# Patient Record
Sex: FEMALE | Race: WHITE | NOT HISPANIC OR LATINO | Employment: UNEMPLOYED | URBAN - NONMETROPOLITAN AREA
[De-identification: names, ages, dates, MRNs, and addresses within clinical notes are randomized per-mention and may not be internally consistent; named-entity substitution may affect disease eponyms.]

---

## 2023-05-30 ENCOUNTER — TELEMEDICINE (OUTPATIENT)
Dept: PRIMARY CARE | Facility: CLINIC | Age: 58
End: 2023-05-30
Payer: COMMERCIAL

## 2023-05-30 DIAGNOSIS — L08.9 SKIN INFECTION: ICD-10-CM

## 2023-05-30 DIAGNOSIS — L97.321 SKIN ULCER OF LEFT ANKLE, LIMITED TO BREAKDOWN OF SKIN (MULTI): Primary | ICD-10-CM

## 2023-05-30 PROBLEM — J04.0 LARYNGITIS: Status: ACTIVE | Noted: 2023-05-30

## 2023-05-30 PROBLEM — J01.00 ACUTE MAXILLARY SINUSITIS: Status: ACTIVE | Noted: 2023-05-30

## 2023-05-30 PROBLEM — U07.1 COVID-19 VIRUS INFECTION: Status: ACTIVE | Noted: 2023-05-30

## 2023-05-30 PROBLEM — J30.9 ALLERGIC RHINITIS: Status: ACTIVE | Noted: 2023-05-30

## 2023-05-30 PROBLEM — R60.9 EDEMA: Status: ACTIVE | Noted: 2023-05-30

## 2023-05-30 PROBLEM — E55.9 VITAMIN D DEFICIENCY: Status: ACTIVE | Noted: 2023-05-30

## 2023-05-30 PROBLEM — J20.9 ACUTE BRONCHITIS: Status: ACTIVE | Noted: 2023-05-30

## 2023-05-30 PROBLEM — R53.82 CHRONIC FATIGUE: Status: ACTIVE | Noted: 2023-05-30

## 2023-05-30 PROBLEM — N39.0 ACUTE LOWER UTI: Status: ACTIVE | Noted: 2023-05-30

## 2023-05-30 PROBLEM — K21.9 GERD WITHOUT ESOPHAGITIS: Status: ACTIVE | Noted: 2023-05-30

## 2023-05-30 PROBLEM — M25.551 ACUTE PAIN OF RIGHT HIP: Status: ACTIVE | Noted: 2023-05-30

## 2023-05-30 PROBLEM — S91.009A WOUND OF ANKLE: Status: ACTIVE | Noted: 2023-05-30

## 2023-05-30 PROBLEM — M54.31 SCIATICA OF RIGHT SIDE: Status: ACTIVE | Noted: 2023-05-30

## 2023-05-30 PROBLEM — F41.9 ANXIETY: Status: ACTIVE | Noted: 2023-05-30

## 2023-05-30 PROBLEM — E78.5 DYSLIPIDEMIA: Status: ACTIVE | Noted: 2023-05-30

## 2023-05-30 PROBLEM — M25.572 LEFT ANKLE PAIN: Status: ACTIVE | Noted: 2023-05-30

## 2023-05-30 PROCEDURE — 99213 OFFICE O/P EST LOW 20 MIN: CPT | Performed by: FAMILY MEDICINE

## 2023-05-30 RX ORDER — FLUTICASONE PROPIONATE 50 MCG
2 SPRAY, SUSPENSION (ML) NASAL 2 TIMES DAILY
COMMUNITY

## 2023-05-30 RX ORDER — LORATADINE 10 MG/1
1 TABLET ORAL NIGHTLY
COMMUNITY
Start: 2020-07-10

## 2023-05-30 RX ORDER — CEPHALEXIN 500 MG/1
500 CAPSULE ORAL 2 TIMES DAILY
Qty: 14 CAPSULE | Refills: 0 | Status: SHIPPED | OUTPATIENT
Start: 2023-05-30 | End: 2023-06-06

## 2023-05-30 RX ORDER — CELECOXIB 200 MG/1
200 CAPSULE ORAL
COMMUNITY

## 2023-05-30 RX ORDER — FUROSEMIDE 40 MG/1
1 TABLET ORAL DAILY
COMMUNITY
Start: 2021-07-25

## 2023-05-30 RX ORDER — CEPHALEXIN 500 MG/1
500 CAPSULE ORAL 2 TIMES DAILY
Qty: 14 CAPSULE | Refills: 0 | Status: SHIPPED | OUTPATIENT
Start: 2023-05-30 | End: 2023-05-30

## 2023-05-30 RX ORDER — POTASSIUM CHLORIDE 750 MG/1
1 TABLET, EXTENDED RELEASE ORAL DAILY
COMMUNITY
Start: 2021-07-25

## 2023-05-31 NOTE — PROGRESS NOTES
Subjective     Nilsa Lo is a 58 y.o. female who presents for Skin Problem (Skin infection lower leg).  Had attended video call appointment  This was completed via telephone due to the restrictions of the COVID-19 pandemic.  All issues as below were discussed and addressed but no physical exam was performed.  If it was felt that the patient should be evaluated in clinic then they were director there.  The patient/parent verbally consented to the visit.    HPI  Complaining wound and skin around the wound is red and tender.  Was seen in urgent care twice.  Started on medication.  Still complaining skin redness around the wound and tenderness.  Recommended adding Keflex.  Complaining the wound on the left ankle      Review of Systems  Dictated as above      Objective   Virtual visit    Physical Exam  Virtual visit  Assessment/Plan   1.  Skin ulcer of left ankle.  Educated on wound care.  Added Keflex.  Explained adverse post medication.  Advised to call 911 or to go to the emergency room as soon as possible if the redness is growing, pain and pain is getting worse, fever, chills, nausea and vomiting.  She understood verbalized understood and agreed.    2.  Skin infection.  Advised on warm compresses.  Started on Keflex.  Advised to call 911 or to go to the emergency room as soon as possible if the redness is growing, pain and pain is getting worse, fever and chills or nausea and vomiting.  She understood verbalized understood and agreed              Problem List Items Addressed This Visit    None  Visit Diagnoses       Skin ulcer of left ankle, limited to breakdown of skin (CMS/[facility])    -  Primary    Relevant Medications    cephalexin (Keflex) 500 mg capsule    Skin infection        Relevant Medications    cephalexin (Keflex) 500 mg capsule

## 2024-01-29 DIAGNOSIS — K21.9 GASTRO-ESOPHAGEAL REFLUX DISEASE WITHOUT ESOPHAGITIS: ICD-10-CM

## 2024-01-29 RX ORDER — ESOMEPRAZOLE MAGNESIUM 40 MG/1
40 CAPSULE, DELAYED RELEASE ORAL DAILY
Qty: 90 CAPSULE | Refills: 0 | Status: SHIPPED | OUTPATIENT
Start: 2024-01-29

## 2024-10-28 NOTE — H&P (VIEW-ONLY)
"Subjective   Patient ID: Nilsa Lo is a 59 y.o. female who presents for Colonoscopy Consult.  HPI  Patient returns to clinic for Colonoscopy Consult.  Patient's last Colonoscopy was on 12/6/2018 and recommended repeat colonoscopy in 10 years.  Per patient she has been having lower abdominal pain and her PCP referred her back to Dr. Back another Colonoscopy.  Patient has not seen a GI specialist and no recent imaging done.  Patient referred by Dr. Abraham.  Describing pain in the left lower quadrant.  She has had it intermittently however in August she had an episode where it was quite pulsatile and very painful.  It stopped her from standing up in a sitting position.  She has not had an episode like that since August but she does have some low-grade discomfort bilateral lower abdomen.  Patient had a CT scan done by the [facility] in September.  I only have the report at this time and it does not mention any evidence of diverticulosis and no evidence of hernia.  He is read as normal.  Patient's weight has been stable.  Patient does have a history of ovarian cysts.    Social History:  Tobacco Use - no   Do you use any recreational drugs -no   Alcohol Use -no   Caffeine Intake - coffee  Working - no  Marital status -   Living with - , 3 kids  Review of Systems    Objective   Physical Exam  BP (!) 148/92 (BP Location: Left arm, Patient Position: Sitting, BP Cuff Size: Adult)   Pulse 78   Temp 37.1 °C (98.7 °F) (Temporal)   Ht 1.575 m (5' 2\")   Wt 89.8 kg (198 lb)   LMP  (LMP Unknown)   SpO2 99%   BMI 36.21 kg/m²    GENERAL APPEARANCE: Patient appears in no acute distress.   EYES: Sclera non-icteric, PERRLA.   ENT Normal appearance of ears and nose.   NECK/THYROID: Neck: no masses. Thyroid: no masses.   LYMPH NODES: No cervical or supraclavicular lymphadenopathy.   CARDIOVASCULAR Heart: RRR, no murmurs; Carotid bruits: none; Peripheral edema: none.   RESPIRATORY: Lungs: clear to " auscultation bilaterally; no respiratory distress.   GI (ABDOMEN) No intraabdominal mass appreciated, no hepatosplenomegaly; Hernia: none, no obvious evidence of inguinal hernia left or right side.  Some minimal tenderness with direct space palpation on the left side.  Tenderness: none.   PSYCH: Patient oriented to time, place and person, normal affect.    Assessment/Plan   Problem List Items Addressed This Visit             ICD-10-CM    Left lower quadrant abdominal pain - Primary R10.32     Patient with lower abdominal pain.  Last colonoscopy 2018.  At this time recommend repeat colonoscopy.  Recommend repeat colonoscopy.  Risk-benefit alternatives of doing the procedure were thoroughly discussed with the patient agrees to proceed.  This includes a 0.02% risk of perforating the colon with polypectomies.  The RN will go over the bowel prep when scheduling.   Patient does not have any evidence of inguinal hernia on exam although her history and pain may be more consistent with this problem.  I need to get a copy of the CT scan done at the [facility] so that I may review the films myself.  Will go over those films and her colonoscopy results and a follow-up appointment         Relevant Orders    Colonoscopy Diagnostic            Myra Back MD 10/31/24 11:28 AM

## 2024-10-31 ENCOUNTER — OFFICE VISIT (OUTPATIENT)
Dept: SURGERY | Facility: CLINIC | Age: 59
End: 2024-10-31
Payer: COMMERCIAL

## 2024-10-31 VITALS
HEART RATE: 78 BPM | OXYGEN SATURATION: 99 % | WEIGHT: 198 LBS | TEMPERATURE: 98.7 F | HEIGHT: 62 IN | DIASTOLIC BLOOD PRESSURE: 92 MMHG | BODY MASS INDEX: 36.44 KG/M2 | SYSTOLIC BLOOD PRESSURE: 148 MMHG

## 2024-10-31 DIAGNOSIS — R10.32 LEFT LOWER QUADRANT ABDOMINAL PAIN: Primary | ICD-10-CM

## 2024-10-31 PROCEDURE — 99204 OFFICE O/P NEW MOD 45 MIN: CPT | Performed by: SURGERY

## 2024-10-31 PROCEDURE — 99214 OFFICE O/P EST MOD 30 MIN: CPT | Performed by: SURGERY

## 2024-10-31 PROCEDURE — 1036F TOBACCO NON-USER: CPT | Performed by: SURGERY

## 2024-10-31 PROCEDURE — 3008F BODY MASS INDEX DOCD: CPT | Performed by: SURGERY

## 2024-10-31 ASSESSMENT — PAIN SCALES - GENERAL
PAINLEVEL_OUTOF10: 2
PAINLEVEL_OUTOF10: 2

## 2024-10-31 ASSESSMENT — COLUMBIA-SUICIDE SEVERITY RATING SCALE - C-SSRS
2. HAVE YOU ACTUALLY HAD ANY THOUGHTS OF KILLING YOURSELF?: NO
6. HAVE YOU EVER DONE ANYTHING, STARTED TO DO ANYTHING, OR PREPARED TO DO ANYTHING TO END YOUR LIFE?: NO
1. IN THE PAST MONTH, HAVE YOU WISHED YOU WERE DEAD OR WISHED YOU COULD GO TO SLEEP AND NOT WAKE UP?: NO

## 2024-10-31 ASSESSMENT — PATIENT HEALTH QUESTIONNAIRE - PHQ9
2. FEELING DOWN, DEPRESSED OR HOPELESS: NOT AT ALL
1. LITTLE INTEREST OR PLEASURE IN DOING THINGS: NOT AT ALL
SUM OF ALL RESPONSES TO PHQ9 QUESTIONS 1 & 2: 0

## 2024-10-31 ASSESSMENT — ENCOUNTER SYMPTOMS
LOSS OF SENSATION IN FEET: 0
DEPRESSION: 0
OCCASIONAL FEELINGS OF UNSTEADINESS: 0

## 2024-11-14 ENCOUNTER — APPOINTMENT (OUTPATIENT)
Dept: PREADMISSION TESTING | Facility: HOSPITAL | Age: 59
End: 2024-11-14
Payer: COMMERCIAL

## 2024-11-18 ENCOUNTER — APPOINTMENT (OUTPATIENT)
Dept: PREADMISSION TESTING | Facility: HOSPITAL | Age: 59
End: 2024-11-18
Payer: COMMERCIAL

## 2024-11-21 ENCOUNTER — PRE-ADMISSION TESTING (OUTPATIENT)
Dept: PREADMISSION TESTING | Facility: HOSPITAL | Age: 59
End: 2024-11-21
Payer: COMMERCIAL

## 2024-11-21 VITALS
HEART RATE: 82 BPM | OXYGEN SATURATION: 99 % | SYSTOLIC BLOOD PRESSURE: 159 MMHG | HEIGHT: 63 IN | DIASTOLIC BLOOD PRESSURE: 80 MMHG | BODY MASS INDEX: 33.66 KG/M2 | TEMPERATURE: 96.6 F | WEIGHT: 190 LBS | RESPIRATION RATE: 16 BRPM

## 2024-11-21 DIAGNOSIS — Z01.818 PRE-OP TESTING: Primary | ICD-10-CM

## 2024-11-21 PROCEDURE — 99203 OFFICE O/P NEW LOW 30 MIN: CPT | Performed by: PHYSICIAN ASSISTANT

## 2024-11-21 RX ORDER — MULTIVITAMIN/IRON/FOLIC ACID 18MG-0.4MG
1 TABLET ORAL DAILY
COMMUNITY

## 2024-11-21 ASSESSMENT — DUKE ACTIVITY SCORE INDEX (DASI)
CAN YOU HAVE SEXUAL RELATIONS: YES
CAN YOU DO LIGHT WORK AROUND THE HOUSE LIKE DUSTING OR WASHING DISHES: YES
CAN YOU WALK A BLOCK OR TWO ON LEVEL GROUND: YES
CAN YOU PARTICIPATE IN MODERATE RECREATIONAL ACTIVITIES LIKE GOLF, BOWLING, DANCING, DOUBLES TENNIS OR THROWING A BASEBALL OR FOOTBALL: YES
CAN YOU DO YARD WORK LIKE RAKING LEAVES, WEEDING OR PUSHING A MOWER: YES
CAN YOU RUN A SHORT DISTANCE: NO
CAN YOU TAKE CARE OF YOURSELF (EAT, DRESS, BATHE, OR USE TOILET): YES
CAN YOU DO HEAVY WORK AROUND THE HOUSE LIKE SCRUBBING FLOORS OR LIFTING AND MOVING HEAVY FURNITURE: YES
DASI METS SCORE: 8
CAN YOU PARTICIPATE IN STRENOUS SPORTS LIKE SWIMMING, SINGLES TENNIS, FOOTBALL, BASKETBALL, OR SKIING: NO
CAN YOU WALK INDOORS, SUCH AS AROUND YOUR HOUSE: YES
CAN YOU DO MODERATE WORK AROUND THE HOUSE LIKE VACUUMING, SWEEPING FLOORS OR CARRYING GROCERIES: YES
CAN YOU CLIMB A FLIGHT OF STAIRS OR WALK UP A HILL: YES
TOTAL_SCORE: 42.7

## 2024-11-21 ASSESSMENT — ENCOUNTER SYMPTOMS: ROS GI COMMENTS: SEE HPI

## 2024-11-21 NOTE — PREPROCEDURE INSTRUCTIONS
PAT DISCHARGE INSTRUCTIONS    Please call the Same Day Surgery (SDS) Department of the hospital where your procedure will be performed after 2:00 PM the day before your surgery. If you are scheduled on a Monday, or a Tuesday following a Monday holiday, you will need to call on the last business day prior to your surgery.    [facility]  [address] [place] [address] [phone]  [facility]  [address] [facility], [id number] [phone]  [facility]  [address] [facility].  [place], [place] [id number] [phone]    Please let your surgeon know if:      You develop any open sores, shingles, burning or painful urination as these may increase your risk of an infection.   You no longer wish to have the surgery.   Any other personal circumstances change that may lead to the need to cancel or defer this surgery-such as being sick or getting admitted to any hospital within one week of your planned procedure.    Your contact details change, such as a change of address or phone number.    Starting now:     Please DO NOT drink alcohol or smoke for 24 hours before surgery. It is well known that quitting smoking can make a huge difference to your health and recovery from surgery. The longer you abstain from smoking, the better your chances of a healthy recovery. If you need help with quitting, call 1-800-QUIT-NOW to be connected to a trained counselor who will discuss the best methods to help you quit.     Before your surgery:    Please stop all supplements 7 days prior to surgery. Or as directed by your surgeon.   Please stop taking NSAID pain medicine such as Advil and Motrin 7 days before surgery.    If you develop any fever, cough, cold, rashes, cuts, scratches, scrapes, urinary symptoms or infection anywhere on your body (including teeth and gums) prior to surgery, please call  your surgeon’s office as soon as possible. This may require treatment to reduce the chance of cancellation on the day of surgery.    The day before your surgery:   DIET- PLEASE FOLLOW DR VASQUES BOWEL PREP INSTRUCTIONS   Get a good night’s rest.  Use the special soap for bathing if you have been instructed to use one.    Scheduled surgery times may change and you will be notified if this occurs - please check your personal voicemail for any updates.     On the morning of surgery:   Wear comfortable, loose fitting clothes which open in the front. Please do not wear moisturizers, creams, lotions, makeup or perfume.    Please bring with you to surgery:   Photo ID and insurance card   Current list of medicines and allergies   Pacemaker/ Defibrillator/Heart stent cards   CPAP machine and mask    Slings/ splints/ crutches   A copy of your complete advanced directive/DHPOA.    Please do NOT bring with you to surgery:   All jewelry and valuables should be left at home.   Prosthetic devices such as contact lenses, hearing aids, dentures, eyelash extensions, hairpins and body piercings must be removed prior to going in to the surgical suite.    After outpatient surgery:   A responsible adult MUST accompany you at the time of discharge and stay with you for 24 hours after your surgery. You may NOT drive yourself home after surgery.    Do not drive, operate machinery, make critical decisions or do activities that require co-ordination or balance until after a night’s sleep.   Do not drink alcoholic beverages for 24 hours.   Instructions for resuming your medications will be provided by your surgeon.    CALL YOUR DOCTOR AFTER SURGERY IF YOU HAVE:     Chills and/or a fever of 101° F or higher.    Redness, swelling, pus or drainage from your surgical wound or a bad smell from the wound.    Lightheadedness, fainting or confusion.    Persistent vomiting (throwing up) and are not able to eat or drink for 12 hours.    Three or more  loose, watery bowel movements in 24 hours (diarrhea).   Difficulty or pain while urinating( after non-urological surgery)    Pain and swelling in your legs, especially if it is only on one side.    Difficulty breathing or are breathing faster than normal.    Any new concerning symptoms.         Medication List            Accurate as of November 21, 2024 10:13 AM. Always use your most recent med list.                b complex 0.4 mg tablet  Additional Medication Adjustments for Surgery: Take last dose 7 days before surgery     celecoxib 200 mg capsule  Commonly known as: CeleBREX  Additional Medication Adjustments for Surgery: Take last dose 7 days before surgery     esomeprazole 40 mg DR capsule  Commonly known as: NexIUM  Take 1 capsule (40 mg) by mouth once daily. Do not open capsule.  Notes to patient: Take evening dose before surgery.     fluticasone 50 mcg/actuation nasal spray  Commonly known as: Flonase  Medication Adjustments for Surgery: Take/Use as prescribed     furosemide 40 mg tablet  Commonly known as: Lasix  Medication Adjustments for Surgery: Do Not take on the morning of surgery     loratadine 10 mg tablet  Commonly known as: Claritin  Medication Adjustments for Surgery: Take/Use as prescribed     potassium chloride CR 10 mEq ER tablet  Commonly known as: Klor-Con  Medication Adjustments for Surgery: Take/Use as prescribed

## 2024-11-21 NOTE — CPM/PAT H&P
"CPM/PAT Evaluation       Name: Nilsa Lo (Nilsa Lo)  /Age: 1965/59 y.o.     In-Person       Chief Complaint: \"abdominal pain\"    HPI  The patient is a 59 year old female.  She had a normal routine colonoscopy in 2018 and was instructed to follow up in 10 years.  For more than 1 year she has experienced intermittent, non-radiating left lower quadrant abdominal pain that can occur at anytime.  The pain varies from mild to severe.  She denies associated nausea, vomiting, diarrhea, constipation, bloating, cramping or melena.  Her appetite is good, weight is stable and she denies a family history of colon cancer.  She was seen by her PCP and a CT of the abdomen and pelvis and pelvis ultrasound were normal.  She was referred to Dr. Back for a colonoscopy.    Past Medical History:   Diagnosis Date    Ankle edema, bilateral     Intermittent    Anxiety     GERD (gastroesophageal reflux disease)     Hyperlipidemia        Past Surgical History:   Procedure Laterality Date    COLONOSCOPY  2018    TONSILLECTOMY         Family History   Problem Relation Name Age of Onset    Diabetes Mother      Hyperlipidemia Mother      Hypertension Mother      Diabetes Father      Hypertension Father      Hypertension Sister jose     Diabetes Brother      Hypertension Brother      Breast cancer Father's Sister      Breast cancer Maternal Grandfather       Social History     Tobacco Use    Smoking status: Former     Current packs/day: 0.00     Average packs/day: 0.3 packs/day for 9.0 years (2.3 ttl pk-yrs)     Types: Cigarettes     Start date:      Quit date:      Years since quittin.9    Smokeless tobacco: Never    Tobacco comments:     SOCIAL SMOKER IN PAST   Substance Use Topics    Alcohol use: Never     Social History     Substance and Sexual Activity   Drug Use Never     Allergies   Allergen Reactions    Grape Fruit Extract Itching     Oranges, watermelon, cantaloupe, grapes, strawberries.. ears and mouth " "become itchy     Nutritional Supplement-Fiber Itching     POTATOES    Latex Itching     pt states if SHE wears latex gloves, her hands get itchy.  however, if someone wearing latex touches her, she's ok.    Methylprednisolone Swelling     Current Outpatient Medications   Medication Sig Dispense Refill    b complex 0.4 mg tablet Take 1 tablet by mouth once daily.      celecoxib (CeleBREX) 200 mg capsule Take 1 capsule (200 mg) by mouth 2 times a day as needed.      esomeprazole (NexIUM) 40 mg DR capsule Take 1 capsule (40 mg) by mouth once daily. Do not open capsule. 90 capsule 0    fluticasone (Flonase) 50 mcg/actuation nasal spray Administer 2 sprays into each nostril 2 times a day as needed.      furosemide (Lasix) 40 mg tablet Take 1 tablet (40 mg) by mouth once daily as needed.      loratadine (Claritin) 10 mg tablet Take 1 tablet (10 mg) by mouth once daily as needed.      potassium chloride CR 10 mEq ER tablet Take 1 tablet (10 mEq) by mouth once daily as needed. WITH LASIX       No current facility-administered medications for this visit.       Review of Systems   Gastrointestinal:         See HPI   All other systems reviewed and are negative.    /80   Pulse 82   Temp 35.9 °C (96.6 °F) (Temporal)   Resp 16   Ht 1.6 m (5' 3\")   Wt 86.2 kg (190 lb)   LMP  (LMP Unknown)   SpO2 99%   BMI 33.66 kg/m²     Physical Exam  Vitals reviewed.   Constitutional:       Comments: Overweight     HENT:      Head: Normocephalic and atraumatic.      Mouth/Throat:      Mouth: Mucous membranes are moist.      Pharynx: Oropharynx is clear.   Eyes:      Extraocular Movements: Extraocular movements intact.      Pupils: Pupils are equal, round, and reactive to light.      Comments: Glasses     Cardiovascular:      Rate and Rhythm: Normal rate and regular rhythm.      Heart sounds: Normal heart sounds.   Pulmonary:      Effort: Pulmonary effort is normal.      Breath sounds: Normal breath sounds.   Abdominal:      " General: Bowel sounds are normal.      Palpations: Abdomen is soft.      Comments: No tenderness on exam today.   Musculoskeletal:         General: No swelling.   Skin:     General: Skin is warm and dry.   Neurological:      General: No focal deficit present.      Mental Status: She is alert and oriented to person, place, and time.   Psychiatric:         Mood and Affect: Mood normal.         Behavior: Behavior normal.        PAT AIRWAY:   Airway:     Mallampati::  I    TM distance::  >3 FB    Neck ROM::  Full   Teeth intact    ASA:  II  DASI SCORE:  42.7  METS SCORE:  8  CHAD2 SCORE:  1.9%  REVISED CARDIAC RISK INDEX:  0.4%  STOP BANG SCORE:  3  CAPRINI DVT SCORE:  5  MARTA SCORE:  0.11%  ARISCAT SCORE:  1.6%    CBC, CMP done 9/4/2024    Assessment and Plan:     Abdominal pain:  Colonoscopy  Intermittent bilateral ankle edema - managed with Lasix PRN - rarely used  Overweight - BMI:  33.66      Rochelle Doty PA-C

## 2024-11-26 ENCOUNTER — HOSPITAL ENCOUNTER (OUTPATIENT)
Dept: GASTROENTEROLOGY | Facility: HOSPITAL | Age: 59
Discharge: HOME | End: 2024-11-26
Payer: COMMERCIAL

## 2024-11-26 ENCOUNTER — ANESTHESIA EVENT (OUTPATIENT)
Dept: GASTROENTEROLOGY | Facility: HOSPITAL | Age: 59
End: 2024-11-26
Payer: COMMERCIAL

## 2024-11-26 ENCOUNTER — ANESTHESIA (OUTPATIENT)
Dept: GASTROENTEROLOGY | Facility: HOSPITAL | Age: 59
End: 2024-11-26
Payer: COMMERCIAL

## 2024-11-26 VITALS
OXYGEN SATURATION: 100 % | SYSTOLIC BLOOD PRESSURE: 147 MMHG | DIASTOLIC BLOOD PRESSURE: 83 MMHG | TEMPERATURE: 96.8 F | HEART RATE: 87 BPM | RESPIRATION RATE: 20 BRPM

## 2024-11-26 DIAGNOSIS — R10.32 LEFT LOWER QUADRANT ABDOMINAL PAIN: ICD-10-CM

## 2024-11-26 PROCEDURE — 7100000010 HC PHASE TWO TIME - EACH INCREMENTAL 1 MINUTE

## 2024-11-26 PROCEDURE — 2720000007 HC OR 272 NO HCPCS

## 2024-11-26 PROCEDURE — 3700000002 HC GENERAL ANESTHESIA TIME - EACH INCREMENTAL 1 MINUTE

## 2024-11-26 PROCEDURE — A45380 PR COLONOSCOPY,BIOPSY: Performed by: NURSE ANESTHETIST, CERTIFIED REGISTERED

## 2024-11-26 PROCEDURE — 45380 COLONOSCOPY AND BIOPSY: CPT | Performed by: SURGERY

## 2024-11-26 PROCEDURE — 7100000001 HC RECOVERY ROOM TIME - INITIAL BASE CHARGE

## 2024-11-26 PROCEDURE — 7100000002 HC RECOVERY ROOM TIME - EACH INCREMENTAL 1 MINUTE

## 2024-11-26 PROCEDURE — 7100000009 HC PHASE TWO TIME - INITIAL BASE CHARGE

## 2024-11-26 PROCEDURE — 3700000001 HC GENERAL ANESTHESIA TIME - INITIAL BASE CHARGE

## 2024-11-26 PROCEDURE — 2500000005 HC RX 250 GENERAL PHARMACY W/O HCPCS: Performed by: NURSE ANESTHETIST, CERTIFIED REGISTERED

## 2024-11-26 PROCEDURE — A45380 PR COLONOSCOPY,BIOPSY: Performed by: ANESTHESIOLOGY

## 2024-11-26 PROCEDURE — 2500000004 HC RX 250 GENERAL PHARMACY W/ HCPCS (ALT 636 FOR OP/ED): Performed by: NURSE ANESTHETIST, CERTIFIED REGISTERED

## 2024-11-26 RX ORDER — LIDOCAINE HYDROCHLORIDE 10 MG/ML
INJECTION, SOLUTION INFILTRATION; PERINEURAL AS NEEDED
Status: DISCONTINUED | OUTPATIENT
Start: 2024-11-26 | End: 2024-11-26

## 2024-11-26 RX ORDER — PROPOFOL 10 MG/ML
INJECTION, EMULSION INTRAVENOUS AS NEEDED
Status: DISCONTINUED | OUTPATIENT
Start: 2024-11-26 | End: 2024-11-26

## 2024-11-26 RX ORDER — MIDAZOLAM HYDROCHLORIDE 1 MG/ML
INJECTION, SOLUTION INTRAMUSCULAR; INTRAVENOUS AS NEEDED
Status: DISCONTINUED | OUTPATIENT
Start: 2024-11-26 | End: 2024-11-26

## 2024-11-26 RX ORDER — GLYCOPYRROLATE 0.2 MG/ML
INJECTION INTRAMUSCULAR; INTRAVENOUS AS NEEDED
Status: DISCONTINUED | OUTPATIENT
Start: 2024-11-26 | End: 2024-11-26

## 2024-11-26 RX ORDER — SODIUM CHLORIDE, SODIUM LACTATE, POTASSIUM CHLORIDE, CALCIUM CHLORIDE 600; 310; 30; 20 MG/100ML; MG/100ML; MG/100ML; MG/100ML
INJECTION, SOLUTION INTRAVENOUS CONTINUOUS PRN
Status: DISCONTINUED | OUTPATIENT
Start: 2024-11-26 | End: 2024-11-26

## 2024-11-26 RX ORDER — SODIUM CHLORIDE, SODIUM LACTATE, POTASSIUM CHLORIDE, CALCIUM CHLORIDE 600; 310; 30; 20 MG/100ML; MG/100ML; MG/100ML; MG/100ML
25 INJECTION, SOLUTION INTRAVENOUS CONTINUOUS
Status: ACTIVE | OUTPATIENT
Start: 2024-11-26 | End: 2024-11-26

## 2024-11-26 SDOH — HEALTH STABILITY: MENTAL HEALTH: CURRENT SMOKER: 0

## 2024-11-26 ASSESSMENT — COLUMBIA-SUICIDE SEVERITY RATING SCALE - C-SSRS
1. IN THE PAST MONTH, HAVE YOU WISHED YOU WERE DEAD OR WISHED YOU COULD GO TO SLEEP AND NOT WAKE UP?: NO
2. HAVE YOU ACTUALLY HAD ANY THOUGHTS OF KILLING YOURSELF?: NO
6. HAVE YOU EVER DONE ANYTHING, STARTED TO DO ANYTHING, OR PREPARED TO DO ANYTHING TO END YOUR LIFE?: NO

## 2024-11-26 ASSESSMENT — PAIN SCALES - GENERAL
PAINLEVEL_OUTOF10: 0 - NO PAIN
PAIN_LEVEL: 0
PAINLEVEL_OUTOF10: 0 - NO PAIN

## 2024-11-26 ASSESSMENT — PAIN - FUNCTIONAL ASSESSMENT
PAIN_FUNCTIONAL_ASSESSMENT: 0-10

## 2024-11-26 NOTE — ANESTHESIA PREPROCEDURE EVALUATION
Patient: Nilsa Lo    Procedure Information       Date/Time: 11/26/24 0945    Scheduled providers: Myra Bakc MD; Juliana Ramos MD MPH; JANNETTE Lucia-CRNA    Procedure: COLONOSCOPY    Location: [facility]            Relevant Problems   Anesthesia (within normal limits)      Cardiac   (+) Hyperlipidemia      Pulmonary (within normal limits)      Neuro   (+) Anxiety   (+) Sciatica of right side      GI   (+) GERD without esophagitis      /Renal   (+) Acute lower UTI      Liver (within normal limits)      Endocrine (within normal limits)      Hematology (within normal limits)      Musculoskeletal (within normal limits)      HEENT   (+) Acute maxillary sinusitis      ID   (+) Acute lower UTI   (+) COVID-19 virus infection      Skin (within normal limits)      GYN (within normal limits)       Clinical information reviewed:   Tobacco  Allergies  Meds   Med Hx  Surg Hx  OB Status  Fam Hx  Soc   Hx        NPO Detail:  NPO/Void Status  Carbohydrate Drink Given Prior to Surgery? : N  Date of Last Liquid: 11/25/24  Date of Last Solid: 11/24/24         Physical Exam    Airway  Mallampati: II  TM distance: >3 FB  Neck ROM: full     Cardiovascular - normal exam     Dental    Pulmonary - normal exam     Abdominal - normal exam         Anesthesia Plan    History of general anesthesia?: yes  History of complications of general anesthesia?: no    ASA 2     MAC     The patient is not a current smoker.  Patient was not previously instructed to abstain from smoking on day of procedure.  Patient did not smoke on day of procedure.  Education provided regarding risk of obstructive sleep apnea.  intravenous induction   Anesthetic plan and risks discussed with patient.  Use of blood products discussed with patient who consented to blood products.    Plan discussed with CRNA.

## 2024-11-26 NOTE — ANESTHESIA POSTPROCEDURE EVALUATION
Patient: Nilsa READ Stone    Procedure Summary       Date: 11/26/24 Room / Location: [facility]    Anesthesia Start: 1001 Anesthesia Stop: 1035    Procedure: COLONOSCOPY Diagnosis: Left lower quadrant abdominal pain    Scheduled Providers: Myra Back MD; Juliana Ramos MD MPH; JANNETTE Lucia-CRNA Responsible Provider: Juliana Ramos MD MPH    Anesthesia Type: MAC ASA Status: 2            Anesthesia Type: MAC    Vitals Value Taken Time   /79 11/26/24 1045   Temp 36.2 °C (97.2 °F) 11/26/24 1034   Pulse 81 11/26/24 1045   Resp 16 11/26/24 1045   SpO2 95 % 11/26/24 1045       Anesthesia Post Evaluation    Patient location during evaluation: PACU  Patient participation: complete - patient participated  Level of consciousness: awake and alert  Pain score: 0  Pain management: adequate  Multimodal analgesia pain management approach  Airway patency: patent  Two or more strategies used to mitigate risk of obstructive sleep apnea  Cardiovascular status: acceptable  Respiratory status: acceptable  Hydration status: acceptable  Postoperative Nausea and Vomiting: none    There were no known notable events for this encounter.

## 2024-11-27 LAB
LABORATORY COMMENT REPORT: NORMAL
PATH REPORT.FINAL DX SPEC: NORMAL
PATH REPORT.GROSS SPEC: NORMAL
PATH REPORT.RELEVANT HX SPEC: NORMAL
PATH REPORT.TOTAL CANCER: NORMAL

## 2024-12-02 NOTE — PROGRESS NOTES
Subjective   Patient ID: Nilsa Lo is a 59 y.o. female who presents for discussion of colonoscopy results.  HPI  Patient returns to clinic and presents for discussion of colonoscopy results.  Patient was last seen in clinic on 10/31/2024 for colonoscopy consult.  Patient had colonoscopy completed on 11/26/2024 by Dr. Back at [facility]. Colon polyp was found and removed. Surgical pathology was collected and finalized. Repeat colonoscopy in 5 years time.    FINAL DIAGNOSIS   A. Rectum, polyp, biopsy:   -- Hyperplastic polyp.   Electronically signed by Mary Dawkins DO on 11/27/2024 at 1338     Impression  Subcentimeter polyp in the rectum was removed with cold forceps biopsy        Findings  One polyp measuring smaller than 5 mm in the rectum; performed cold forceps biopsy with complete en bloc removal        Recommendation  Repeat colonoscopy in 5 years, due: 11/25/2029            Indication  Left lower quadrant abdominal pain     Social History:  Tobacco Use - no   Do you use any recreational drugs -no   Alcohol Use -no   Caffeine Intake - coffee  Working - no  Marital status -   Living with - , 3 kids    Review of Systems    Objective   Physical Exam    Assessment/Plan   {Assess/PlanSmartLinks:53414}         KATHERYN MCHUGH MA 12/02/24 8:49 AM

## 2024-12-09 ENCOUNTER — APPOINTMENT (OUTPATIENT)
Dept: SURGERY | Facility: CLINIC | Age: 59
End: 2024-12-09
Payer: COMMERCIAL

## 2024-12-09 NOTE — PROGRESS NOTES
Subjective   Patient ID: Nilsa Lo is a 59 y.o. female who presents for discussion of colonoscopy results.  HPI  Patient returns to clinic and presents for discussion of colonoscopy results.  Patient was last seen in clinic on 10/31/2024 for colonoscopy consult.  Patient with lower abdominal pain.  Last colonoscopy 2018.  At this time recommend repeat colonoscopy. This includes a 0.02% risk of perforating the colon with polypectomies. Patient does not have any evidence of inguinal hernia on exam although her history and pain may be more consistent with this problem.    Patient had colonoscopy completed on 11/26/2024 by Dr. Back at [facility]. Colon polyp was found and removed. Surgical pathology was collected and finalized. Repeat colonoscopy in 5 years time.    FINAL DIAGNOSIS   A. Rectum, polyp, biopsy:   -- Hyperplastic polyp.   Electronically signed by Mary Dawkins DO on 11/27/2024 at 1338     Impression  Subcentimeter polyp in the rectum was removed with cold forceps biopsy        Findings  One polyp measuring smaller than 5 mm in the rectum; performed cold forceps biopsy with complete en bloc removal        Recommendation  Repeat colonoscopy in 5 years, due: 11/25/2029            Indication  Left lower quadrant abdominal pain     Social History:  Tobacco Use - no   Do you use any recreational drugs -no   Alcohol Use -no   Caffeine Intake - coffee  Working - no  Marital status -   Living with - , 3 kids    Review of Systems    Past Medical History:   Diagnosis Date    Ankle edema, bilateral     Intermittent    Anxiety     GERD (gastroesophageal reflux disease)     Hyperlipidemia      Past Surgical History:   Procedure Laterality Date    COLONOSCOPY  2018    TONSILLECTOMY       Family History   Problem Relation Name Age of Onset    Diabetes Mother      Hyperlipidemia Mother      Hypertension Mother      Diabetes Father      Hypertension Father      Hypertension  "Sister jose     Diabetes Brother      Hypertension Brother      Breast cancer Father's Sister      Breast cancer Maternal Grandfather       Allergies   Allergen Reactions    Grape Fruit Extract Itching     Oranges, watermelon, cantaloupe, grapes, strawberries.. ears and mouth become itchy     Nutritional Supplement-Fiber Itching     POTATOES    Latex Itching     pt states if SHE wears latex gloves, her hands get itchy.  however, if someone wearing latex touches her, she's ok.    Methylprednisolone Swelling     BP (!) 156/102 (Patient Position: Sitting)   Pulse 92   Temp 36.4 °C (97.5 °F)   Ht 1.6 m (5' 3\")   Wt 84.8 kg (187 lb)   LMP  (LMP Unknown)   SpO2 100%   BMI 33.13 kg/m²       Objective   Physical Exam  Patient with minimal direct space discomfort.  No obvious hernia.  When I do palpate in this area is not reproducing the kind of pain she described with her first visit  Assessment/Plan   Problem List Items Addressed This Visit             ICD-10-CM    Left lower quadrant abdominal pain - Primary R10.32     Patient CT report shows no evidence of hernia however I am still trying to see the films myself to review for potential weakness at spigelian space on the left side or potential weakness in the direct space seen on CT in the left groin.  That being said no evidence of colonoscopic source of her discomfort.  No diverticulosis.  Patient informed that if it is indeed a hernia it will continue to occur more frequently and eventually be accompanied by bulging.  I will call her after I reviewed the CT scan.  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  Photos of the procedure  and pathology from the procedure were thoroughly reviewed with the patient. Status post colonoscopy no evidence of diverticular disease.  Patient did have a polyp in the rectum.  It was hyperplastic.  Recommend repeat colonoscopy in 5 " years                 Myra Back MD 12/12/24 11:15 AM

## 2024-12-12 ENCOUNTER — OFFICE VISIT (OUTPATIENT)
Dept: SURGERY | Facility: CLINIC | Age: 59
End: 2024-12-12
Payer: COMMERCIAL

## 2024-12-12 VITALS
TEMPERATURE: 97.5 F | BODY MASS INDEX: 33.13 KG/M2 | DIASTOLIC BLOOD PRESSURE: 102 MMHG | WEIGHT: 187 LBS | SYSTOLIC BLOOD PRESSURE: 156 MMHG | HEIGHT: 63 IN | HEART RATE: 92 BPM | OXYGEN SATURATION: 100 %

## 2024-12-12 DIAGNOSIS — K63.5 HYPERPLASTIC COLONIC POLYP, UNSPECIFIED PART OF COLON: ICD-10-CM

## 2024-12-12 DIAGNOSIS — R10.32 LEFT LOWER QUADRANT ABDOMINAL PAIN: Primary | ICD-10-CM

## 2024-12-12 PROCEDURE — 99213 OFFICE O/P EST LOW 20 MIN: CPT | Performed by: SURGERY

## 2024-12-12 PROCEDURE — 1036F TOBACCO NON-USER: CPT | Performed by: SURGERY

## 2024-12-12 PROCEDURE — 3008F BODY MASS INDEX DOCD: CPT | Performed by: SURGERY

## 2024-12-12 ASSESSMENT — PAIN SCALES - GENERAL: PAINLEVEL_OUTOF10: 0-NO PAIN

## 2024-12-12 ASSESSMENT — PATIENT HEALTH QUESTIONNAIRE - PHQ9
2. FEELING DOWN, DEPRESSED OR HOPELESS: NOT AT ALL
SUM OF ALL RESPONSES TO PHQ9 QUESTIONS 1 AND 2: 0
1. LITTLE INTEREST OR PLEASURE IN DOING THINGS: NOT AT ALL

## 2024-12-12 NOTE — ASSESSMENT & PLAN NOTE
The entire endoscopic procedure was discussed.  Photos of the procedure  and pathology from the procedure were thoroughly reviewed with the patient. Status post colonoscopy no evidence of diverticular disease.  Patient did have a polyp in the rectum.  It was hyperplastic.  Recommend repeat colonoscopy in 5 years

## 2024-12-12 NOTE — ASSESSMENT & PLAN NOTE
Patient CT report shows no evidence of hernia however I am still trying to see the films myself to review for potential weakness at spigelian space on the left side or potential weakness in the direct space seen on CT in the left groin.  That being said no evidence of colonoscopic source of her discomfort.  No diverticulosis.  Patient informed that if it is indeed a hernia it will continue to occur more frequently and eventually be accompanied by bulging.  I will call her after I reviewed the CT scan.  Otherwise we will continue to treat conservatively